# Patient Record
Sex: FEMALE | Race: WHITE | URBAN - METROPOLITAN AREA
[De-identification: names, ages, dates, MRNs, and addresses within clinical notes are randomized per-mention and may not be internally consistent; named-entity substitution may affect disease eponyms.]

---

## 2023-06-27 ENCOUNTER — SURGERY (OUTPATIENT)
Facility: LOCATION | Age: 83
End: 2023-06-27
Payer: COMMERCIAL

## 2023-06-27 PROCEDURE — 66984 XCAPSL CTRC RMVL W/O ECP: CPT | Performed by: OPHTHALMOLOGY

## 2023-06-28 ENCOUNTER — POST-OPERATIVE VISIT (OUTPATIENT)
Facility: LOCATION | Age: 83
End: 2023-06-28
Payer: COMMERCIAL

## 2023-06-28 DIAGNOSIS — Z48.810 ENCOUNTER FOR SURGICAL AFTERCARE FOLLOWING SURGERY ON A SENSE ORGAN: Primary | ICD-10-CM

## 2023-06-28 PROCEDURE — 99024 POSTOP FOLLOW-UP VISIT: CPT | Performed by: OPHTHALMOLOGY

## 2023-06-28 ASSESSMENT — INTRAOCULAR PRESSURE: OD: 14

## 2023-06-28 NOTE — IMPRESSION/PLAN
Impression: S/P CE/Standard IOL OD - 1 Day. Encounter for surgical aftercare following surgery on a sense organ  Z48.810. Plan: s/p CEIOL OD  - Patient doing well post cataract surgery. Taper medications as directed. Patient to call back for any problems or changes in postoperative course.

## 2023-07-14 ENCOUNTER — POST-OPERATIVE VISIT (OUTPATIENT)
Facility: LOCATION | Age: 83
End: 2023-07-14
Payer: COMMERCIAL

## 2023-07-14 DIAGNOSIS — H25.12 AGE-RELATED NUCLEAR CATARACT, LEFT EYE: ICD-10-CM

## 2023-07-14 DIAGNOSIS — Z48.810 ENCOUNTER FOR SURGICAL AFTERCARE FOLLOWING SURGERY ON A SENSE ORGAN: Primary | ICD-10-CM

## 2023-07-14 PROCEDURE — 99024 POSTOP FOLLOW-UP VISIT: CPT | Performed by: OPHTHALMOLOGY

## 2023-07-14 ASSESSMENT — INTRAOCULAR PRESSURE
OD: 14
OS: 14

## 2023-07-14 NOTE — IMPRESSION/PLAN
Impression: Age-related nuclear cataract, left eye: H25.12. Plan: Cataract OS : Cataract is visually significant and interfering with patient's visual funtion. Patient desires to see better and have cataract surgery. Retina is sufficiently stable to allow safe cataract surgery. If red reflex is not visibile during cataract surgery, trypan blue may be necessary to aid in cataract extraction safety. If dilation is poor at the time of cataract surgery, a Malyugin ring or iris hooks may be necessary to safely aid in cataract removal.  Recommend lens caclulations and cataract extraction. Risks, benefits, and alternatives discussed with patient. Patient agrees to proceed with surgery. Standard/ smart drops/ and dextenza.

## 2023-07-14 NOTE — IMPRESSION/PLAN
Impression: S/P CE/Standard IOL OD - 17 Days. Encounter for surgical aftercare following surgery on a sense organ  Z48.810. Plan: s/p CEIOL OD  - Patient doing well post cataract surgery. Taper medications as directed. Patient to call back for any problems or changes in postoperative course.

## 2023-08-17 ENCOUNTER — POST-OPERATIVE VISIT (OUTPATIENT)
Facility: LOCATION | Age: 83
End: 2023-08-17
Payer: COMMERCIAL

## 2023-08-17 DIAGNOSIS — Z96.1 PRESENCE OF INTRAOCULAR LENS: Primary | ICD-10-CM

## 2023-08-17 PROCEDURE — 99024 POSTOP FOLLOW-UP VISIT: CPT | Performed by: OPHTHALMOLOGY

## 2023-08-17 ASSESSMENT — INTRAOCULAR PRESSURE: OS: 17
